# Patient Record
Sex: MALE | URBAN - METROPOLITAN AREA
[De-identification: names, ages, dates, MRNs, and addresses within clinical notes are randomized per-mention and may not be internally consistent; named-entity substitution may affect disease eponyms.]

---

## 2017-08-25 ENCOUNTER — OFFICE VISIT (OUTPATIENT)
Dept: URGENT CARE | Facility: URGENT CARE | Age: 22
End: 2017-08-25

## 2017-08-25 ENCOUNTER — RADIANT APPOINTMENT (OUTPATIENT)
Dept: GENERAL RADIOLOGY | Facility: CLINIC | Age: 22
End: 2017-08-25
Attending: FAMILY MEDICINE

## 2017-08-25 VITALS — TEMPERATURE: 98.1 F | HEART RATE: 55 BPM | OXYGEN SATURATION: 100 % | WEIGHT: 174.3 LBS

## 2017-08-25 DIAGNOSIS — S67.10XA CRUSH INJURY TO FINGER, INITIAL ENCOUNTER: Primary | ICD-10-CM

## 2017-08-25 DIAGNOSIS — S61.211A LACERATION OF LEFT INDEX FINGER WITHOUT FOREIGN BODY WITHOUT DAMAGE TO NAIL, INITIAL ENCOUNTER: ICD-10-CM

## 2017-08-25 PROCEDURE — 12002 RPR S/N/AX/GEN/TRNK2.6-7.5CM: CPT | Performed by: FAMILY MEDICINE

## 2017-08-25 PROCEDURE — 99203 OFFICE O/P NEW LOW 30 MIN: CPT | Mod: 25 | Performed by: FAMILY MEDICINE

## 2017-08-25 PROCEDURE — 73140 X-RAY EXAM OF FINGER(S): CPT | Mod: LT

## 2017-08-25 RX ORDER — IBUPROFEN 800 MG/1
800 TABLET, FILM COATED ORAL EVERY 8 HOURS PRN
Qty: 30 TABLET | Refills: 0 | Status: SHIPPED | OUTPATIENT
Start: 2017-08-25 | End: 2017-09-04

## 2017-08-25 NOTE — PATIENT INSTRUCTIONS
lacer  Laceración, Extremidad [Laceration, Extremity, Sutures, Staples, Or Tape]  Gallito laceración es gallito cortada en la piel. Si es profunda, suele requerir puntos (suturas) o grapas para cerrarla y que pueda cicatrizar. Las cortadas menores pueden tratarse con cinta quirúrgica. Pueden realizarle radiografías si es posible que algo haya entrado en la herida a través del isaias. También es posible que necesite que le coloquen la vacuna antitetánica. Se le colocará si no la tiene actualizada.     Cuidados En [place]       Siga las instrucciones del proveedor de atención médica sobre cómo cuidar de la herida.    Para ayudar a evitar gallito infección, lávese las robb con agua y jabón antes y después de curar la herida.    Mantenga la herida limpia y seca. Si le colocaron un vendaje y éste se moja o se ensucia, cámbielo. En los demás casos, déjelo puesto deepthi las primeras 24 horas y luego cámbielo gallito vez por día o según le indiquen.     Si usaron puntos o grapas limpie la herida todos los días:    Después de quitar el vendaje, lave la terrence afectada con agua y jabón. Use un palillo de algodón (Q tip) para ablandar y limpiar la naveen o las costras que se hayan formado.    Después de red limpiado, mantenga la herida limpia y seca. Hable con isaacs médico antes de aplicarse ninguna pomada antibiótica en la herida. Vuelva a poner el vendaje.     Puede quitar el vendaje para ducharse tamie de costumbre gallito vez transcurridas 24 horas, kesha no moje la terrence afectada en agua (no se sumerja en bañeras o piscinas) hasta que le hayan quitado los puntos o las grapas.    Si usó cinta quirúrgica, mantenga la terrence limpia y seca. Si se moja, séquela con gallito toalla sin frotarla.     El médico puede recetarle gallito crema o pomada antibiótica para evitar gallito infección. No deje de herman estos medicamentos hasta completar el tratamiento o hasta que el médico le diga que deje de hacerlo. El médico puede recetarle medicamentos para el dolor. Tómelos  de acuerdo con las indicaciones del médico.    Evite las actividades que puedan reabrir la herida.      Seguimiento  Programe gallito visita de control con isaacs proveedor de atención médica. La mayoría de las lesiones en la piel sanan en wilner donny. De todas formas, a veces es posible que ocurra gallito infección aún con el tratamiento adecuado. Por lo tanto, revise la herida todos los días para grayson si muestra alguna de las señales de infección descritas a continuación. Los puntos o las grapas deben quitarse en un plazo de 7-14 días. Si usaron cinta quirúrgica, puede quitársela al cabo de 10 días si no se ha caído todavía.     Busque Atención Médica  Llame de inmediato a isaacs proveedor de atención médica si algo de lo siguiente ocurre:    Sangrado que no puede controlarse aplicando presión directa    Señales de infección, incluidos aumento del dolor en la herida, enrojecimiento, hinchazón o pus o mal olor procedentes de la herida    Fiebre de 100.4 F (38 C) o más pablo, o tamie le haya indicado isaacs proveedor de atención médica    Los puntos o las grapas se desprenden o se caen antes de los 7 días    Los bordes de la herida se abren    La herida cambia de color    Adormecimiento alrededor de la herida    Disminución de la movilidad alrededor de la terrence herida  Date Last Reviewed: 6/14/2015 2000-2017 [employer]. [address] [place] [id number]. Todos los derechos reservados. Esta información no pretende sustituir la atención médica profesional. Sólo isaacs médico puede diagnosticar y tratar un problema de anastasia.      Follow up in 5 days for recheck-- sutures may be removed in 5-7 days.    Prescribed Augmentin 875mg po bid x 10 days to prevent infection and ibuprofen 800mg po tid for pain.    Work restrictions to include keeping finger clean and dry.  No work gloves.  No immersion of hands in water for long periods of time other than showering.

## 2017-08-25 NOTE — LETTER
[facility]  [address]  [place] [address]                  [phone]   August 28, 2017    Jerardo Tena[address]  [facility] [address]      Dear Jerardo,    Here is a summary of your recent test results:    Xray report with no acute fracture    Your test results are enclosed.      Please contact me if you have any questions.           Thank you very much for choosing [facility]    Best regards,    Dr. Sami Aguayo        Results for orders placed or performed in visit on 08/25/17   XR Finger Left G/E 2 Views    Narrative    LEFT FINGER TWO OR MORE VIEWS  8/25/2017 7:29 PM     HISTORY: Crush injury to tip of finger with split lac on medial  aspect. Crushing injury of unspecified finger(s), initial encounter.  Laceration without foreign body of left index finger without damage to  nail, initial encounter.      Impression    IMPRESSION: Three views of the left middle finger are performed. Soft  tissue defect is noted in the distal middle finger. No underlying  fracture or dislocation is evident. No radiopaque foreign body is  appreciated.    KRISTIN ROBISON MD

## 2017-08-25 NOTE — MR AVS SNAPSHOT
After Visit Summary   8/25/2017    Jerardo Hernandez    MRN: [id number]           Patient Information     Date Of Birth          1995        Visit Information        Provider Department      8/25/2017 6:20 PM Tamy Mejía MD [facility]        Today's Diagnoses     Crush injury to finger, initial encounter    -  1    Laceration of left index finger without foreign body without damage to nail, initial encounter          Care Instructions    lacer  Laceración, Extremidad [Laceration, Extremity, Sutures, Staples, Or Tape]  Gallito laceración es gallito cortada en la piel. Si es profunda, suele requerir puntos (suturas) o grapas para cerrarla y que pueda cicatrizar. Las cortadas menores pueden tratarse con cinta quirúrgica. Pueden realizarle radiografías si es posible que algo haya entrado en la herida a través del isaias. También es posible que necesite que le coloquen la vacuna antitetánica. Se le colocará si no la tiene actualizada.     Cuidados En [place]       Siga las instrucciones del proveedor de atención médica sobre cómo cuidar de la herida.    Para ayudar a evitar gallito infección, lávese las robb con agua y jabón antes y después de curar la herida.    Mantenga la herida limpia y seca. Si le colocaron un vendaje y éste se moja o se ensucia, cámbielo. En los demás casos, déjelo puesto deepthi las primeras 24 horas y luego cámbielo gallito vez por día o según le indiquen.     Si usaron puntos o grapas limpie la herida todos los días:    Después de quitar el vendaje, lave la terrence afectada con agua y jabón. Use un palillo de algodón (Q tip) para ablandar y limpiar la naveen o las costras que se hayan formado.    Después de red limpiado, mantenga la herida limpia y seca. Hable con isaacs médico antes de aplicarse ninguna pomada antibiótica en la herida. Vuelva a poner el vendaje.     Puede quitar el vendaje para ducharse tamie de costumbre gallito vez transcurridas 24 horas, kesha no  moje la terrence afectada en agua (no se sumerja en bañeras o piscinas) hasta que le hayan quitado los puntos o las grapas.    Si usó cinta quirúrgica, mantenga la terrence limpia y seca. Si se moja, séquela con gallito toalla sin frotarla.     El médico puede recetarle gallito crema o pomada antibiótica para evitar gallito infección. No deje de herman estos medicamentos hasta completar el tratamiento o hasta que el médico le diga que deje de hacerlo. El médico puede recetarle medicamentos para el dolor. Tómelos de acuerdo con las indicaciones del médico.    Evite las actividades que puedan reabrir la herida.      Seguimiento  Programe gallito visita de control con isaacs proveedor de atención médica. La mayoría de las lesiones en la piel sanan en wilner donny. De todas formas, a veces es posible que ocurra gallito infección aún con el tratamiento adecuado. Por lo tanto, revise la herida todos los días para grayson si muestra alguna de las señales de infección descritas a continuación. Los puntos o las grapas deben quitarse en un plazo de 7-14 días. Si usaron cinta quirúrgica, puede quitársela al cabo de 10 días si no se ha caído todavía.     Busque Atención Médica  Llame de inmediato a isaacs proveedor de atención médica si algo de lo siguiente ocurre:    Sangrado que no puede controlarse aplicando presión directa    Señales de infección, incluidos aumento del dolor en la herida, enrojecimiento, hinchazón o pus o mal olor procedentes de la herida    Fiebre de 100.4 F (38 C) o más pablo, o tamie le haya indicado isaacs proveedor de atención médica    Los puntos o las grapas se desprenden o se caen antes de los 7 días    Los bordes de la herida se abren    La herida cambia de color    Adormecimiento alrededor de la herida    Disminución de la movilidad alrededor de la terrence herida  Date Last Reviewed: 6/14/2015 2000-2017 [employer] [place]. [address] [place] [id number]. Todos los derechos reservados. Esta información no pretende sustituir la  "atención médica profesional. Sólo isaacs médico puede diagnosticar y tratar un problema de anastasia.      Follow up in 5 days for recheck-- sutures may be removed in 5-7 days.    Prescribed Augmentin 875mg po bid x 10 days to prevent infection and ibuprofen 800mg po tid for pain.    Work restrictions to include keeping finger clean and dry.  No work gloves.  No immersion of hands in water for long periods of time other than showering.            Follow-ups after your visit        Follow-up notes from your care team     Return if symptoms worsen or fail to improve.      Who to contact     If you have questions or need follow up information about today's clinic visit or your schedule please contact [facility] directly at [phone].  Normal or non-critical lab and imaging results will be communicated to you by MyChart, letter or phone within 4 business days after the clinic has received the results. If you do not hear from us within 7 days, please contact the clinic through [employer] or phone. If you have a critical or abnormal lab result, we will notify you by phone as soon as possible.  Submit refill requests through [employer] or call your pharmacy and they will forward the refill request to us. Please allow 3 business days for your refill to be completed.          Additional Information About Your Visit        [employer] Information     [employer] lets you send messages to your doctor, view your test results, renew your prescriptions, schedule appointments and more. To sign up, go to [email or web address] . Click on \"Log in\" on the left side of the screen, which will take you to the Welcome page. Then click on \"Sign up Now\" on the right side of the page.     You will be asked to enter the access code listed below, as well as some personal information. Please follow the directions to create your username and password.     Your access code is: [email or web address]  Expires: 11/23/2017  7:56 PM     Your access code will "  in 90 days. If you need help or a new code, please call your [place] clinic or [phone].        Care EveryWhere ID     This is your Care EveryWhere ID. This could be used by other organizations to access your [place] medical records  [id number]        Your Vitals Were     Pulse Temperature Pulse Oximetry             55 98.1  F (36.7  C) (Oral) 100%          Blood Pressure from Last 3 Encounters:   No data found for BP    Weight from Last 3 Encounters:   17 174 lb 4.8 oz (79.1 kg)              We Performed the Following     XR Finger Left G/E 2 Views          Today's Medication Changes          These changes are accurate as of: 17  8:00 PM.  If you have any questions, ask your nurse or doctor.               Start taking these medicines.        Dose/Directions    amoxicillin-clavulanate 875-125 MG per tablet   Commonly known as:  AUGMENTIN   Used for:  Laceration of left index finger without foreign body without damage to nail, initial encounter, Crush injury to finger, initial encounter   Started by:  Tamy Mejía MD        Dose:  1 tablet   Take 1 tablet by mouth 2 times daily for 10 days   Quantity:  20 tablet   Refills:  0       ibuprofen 800 MG tablet   Commonly known as:  ADVIL/MOTRIN   Used for:  Crush injury to finger, initial encounter, Laceration of left index finger without foreign body without damage to nail, initial encounter   Started by:  Tamy Mejía MD        Dose:  800 mg   Take 1 tablet (800 mg) by mouth every 8 hours as needed for moderate pain   Quantity:  30 tablet   Refills:  0            Where to get your medicines      These medications were sent to [facility] - [place], [place] - [address]  [address] [place] [address]     Phone:  [phone]     amoxicillin-clavulanate 875-125 MG per tablet    ibuprofen 800 MG tablet                Primary Care Provider     None Specified       No primary provider on file.        Equal Access to Services     CHARLENE HALEY : Hadii aad ku hadlucypietro Major, wasravanthida temichaceha, qamelita oleghoodgisela taylor. So [facility] [phone].    ATENCIÓN: Si habla español, tiene a isaacs disposición servicios gratuitos de asistencia lingüística. Llame al [phone].    We comply with applicable federal civil rights laws and [place] laws. We do not discriminate on the basis of race, color, national origin, age, disability sex, sexual orientation or gender identity.            Thank you!     Thank you for choosing [facility]  for your care. Our goal is always to provide you with excellent care. Hearing back from our patients is one way we can continue to improve our services. Please take a few minutes to complete the written survey that you may receive in the mail after your visit with us. Thank you!             Your Updated Medication List - Protect others around you: Learn how to safely use, store and throw away your medicines at www.disposemymeds.org.          This list is accurate as of: 8/25/17  8:00 PM.  Always use your most recent med list.                   Brand Name Dispense Instructions for use Diagnosis    amoxicillin-clavulanate 875-125 MG per tablet    AUGMENTIN    20 tablet    Take 1 tablet by mouth 2 times daily for 10 days    Laceration of left index finger without foreign body without damage to nail, initial encounter, Crush injury to finger, initial encounter       ibuprofen 800 MG tablet    ADVIL/MOTRIN    30 tablet    Take 1 tablet (800 mg) by mouth every 8 hours as needed for moderate pain    Crush injury to finger, initial encounter, Laceration of left index finger without foreign body without damage to nail, initial encounter

## 2017-08-25 NOTE — PROGRESS NOTES
History of Present Illness:  Patient is a 22 year old male who presents s/p crush injury of LEFT third fingertip after catching finger while trying to connect a hitch earlier this afternoon.  States last tetanus vaccine was 2 years ago.  Split laceration from crush injury noted as well along medial aspect of LEFT third finger tip with shearing of skin at medial edge of nail.  States pain is tolerable feeling that his finger is more asleep than painful.    ROS:  General: Denies any fever or chills  HEENT: Denies eye pain, ear pain, throat pain or runny nose  NECK: Denies neck pain  LUNGS: Denies cough or SOB  CV: Denies chest pain  Abdomen: Denies abdominal pain, denies change in stool  SKIN: Denies rash.  NEURO: Denies dizziness  MUSCULOSKELETAL: +Crush injury of LEFT third finger with macerated tip with sheared skin medial to nail and a skin flap medial to sheared skin  PSYCH: Denies mood change    Objective:  Pulse 55  Temp 98.1  F (36.7  C) (Oral)  Wt 174 lb 4.8 oz (79.1 kg)  SpO2 100%    General:  Patient is alert.  In NAD.  HEENT: NC/AT, PERRL, EOMI  NECK: supple, no LAD  LEFT third finger tip: Crush injury with macerated tip of finger with sheared skin flap medial to sheared skin-- fat tissue out- difficult to approximate the edges.  Area cleaned with Hibiclens and ICE for comfort and swelling prior to sutures.  SKIN: No rashes  NEURO: CN II-XII grossly intact  MUSCULOSKELETAL: No deficits noted.  Normal strength and ROM of LEFT hand and at DIP and PIP third finger  PSYCH: Normal mood and affect    Xray negative for fracture    PROCEDURE  Verbal consent obtained.  1% lidocaine used for anesthesia- 6 cc injected.  #3 interrupted 5.0 Ethilon sutures placed to best approximate the skin flap tissue measuring 2 cm vertical by 2 cm horizontal and close this area.  Gauze tube wrap applied.      Assessment:  Crush injury LEFT third fingertip  Laceration secondary to crush injury medial aspect of LEFT third  finger    Plan:  Prescribed Augmentin 875mg po bid x 10 days to help prevent infection with sheared skin and mechanism of crush injury.  Prescribed ibuprofen 800mg po tid prn pain and swelling.  Wound care instructions given to keep area clean and dry.  FU 5 days for recheck as area was macerated and it was very difficult to approximate and close flap with swelling and fat pushing out beneath flap.    Work restrictions discussed with patient's employer representative here with him nick.    All questions were answered.  Patient voices understanding of the plan at time of discharge.

## 2017-09-01 ENCOUNTER — OFFICE VISIT (OUTPATIENT)
Dept: PEDIATRICS | Facility: CLINIC | Age: 22
End: 2017-09-01

## 2017-09-01 VITALS
HEART RATE: 66 BPM | TEMPERATURE: 97.7 F | SYSTOLIC BLOOD PRESSURE: 120 MMHG | DIASTOLIC BLOOD PRESSURE: 65 MMHG | WEIGHT: 176.7 LBS

## 2017-09-01 DIAGNOSIS — S67.22XD CRUSHING INJURY OF FINGER OF LEFT HAND, SUBSEQUENT ENCOUNTER: Primary | ICD-10-CM

## 2017-09-01 PROCEDURE — 99213 OFFICE O/P EST LOW 20 MIN: CPT | Performed by: NURSE PRACTITIONER

## 2017-09-01 NOTE — NURSING NOTE
Chief Complaint   Patient presents with     Work Comp       Initial /65  Pulse 66  Temp 97.7  F (36.5  C) (Tympanic)  Wt 176 lb 11.2 oz (80.2 kg) There is no height or weight on file to calculate BMI.  Medication Reconciliation: complete

## 2017-09-01 NOTE — LETTER
To Whom It May Concern:    Jerardo Hernandez is a patient of mine. He was evaluated in clinic today and it is my medical opinion that he be allowed to take breaks when doing fine motor activities related to pain. He should keep finger covered at all times.               Thank you           Karin Ziegler, [facility] [place]

## 2017-09-01 NOTE — MR AVS SNAPSHOT
After Visit Summary   9/1/2017    Jerardo Hernandez    MRN: [id number]           Patient Information     Date Of Birth          1995        Visit Information        Provider Department      9/1/2017 9:15 AM Karin Ziegler APRN CNP; [employer] [facility] [place]        Today's Diagnoses     Crushing injury of finger of left hand, subsequent encounter    -  1       Follow-ups after your visit        Additional Services     ORTHO  REFERRAL       [facility] is referring you to the Orthopedic  Services at [facility].       The  Representative will assist you in the coordination of your Orthopedic and Musculoskeletal Care as prescribed by your physician.    The  Representative will call you within 1 business day to help schedule your appointment, or you may contact the  Representative at:    All areas ~ [phone]     Type of Referral : Non Surgical HAND SURGEON      Timeframe requested: 1 wk    Coverage of these services is subject to the terms and limitations of your health insurance plan.  Please call member services at your health plan with any benefit or coverage questions.      If X-rays, CT or MRI's have been performed, please contact the facility where they were done to arrange for , prior to your scheduled appointment.  Please bring this referral request to your appointment and present it to your specialist.                  Who to contact     If you have questions or need follow up information about today's clinic visit or your schedule please contact [facility] directly at [phone].  Normal or non-critical lab and imaging results will be communicated to you by MyChart, letter or phone within 4 business days after the clinic has received the results. If you do not hear from us within 7 days, please contact the clinic through MyChart or phone. If you have a  "critical or abnormal lab result, we will notify you by phone as soon as possible.  Submit refill requests through [employer] or call your pharmacy and they will forward the refill request to us. Please allow 3 business days for your refill to be completed.          Additional Information About Your Visit        MyChart Information     [employer] lets you send messages to your doctor, view your test results, renew your prescriptions, schedule appointments and more. To sign up, go to [email or web address] . Click on \"Log in\" on the left side of the screen, which will take you to the Welcome page. Then click on \"Sign up Now\" on the right side of the page.     You will be asked to enter the access code listed below, as well as some personal information. Please follow the directions to create your username and password.     Your access code is: [email or web address]  Expires: 2017  7:56 PM     Your access code will  in 90 days. If you need help or a new code, please call your [place] clinic or [phone].        Care EveryWhere ID     This is your Care EveryWhere ID. This could be used by other organizations to access your [place] medical records  [id number]        Your Vitals Were     Pulse Temperature                66 97.7  F (36.5  C) (Tympanic)           Blood Pressure from Last 3 Encounters:   17 120/65    Weight from Last 3 Encounters:   17 176 lb 11.2 oz (80.2 kg)   17 174 lb 4.8 oz (79.1 kg)              We Performed the Following     ORTHO  REFERRAL     SUTURE REMOVAL TRAY        Primary Care Provider    None Specified       No primary provider on file.        Equal Access to Services     [facility]: Hadii asher ku hadasho Sohiwot, waaxda luqadaha, qaybta kaalmada brent, gisela calle. So [facility] [phone].    ATENCIÓN: Si habla español, tiene a isaacs disposición servicios gratuitos de asistencia lingüística. Llame al [phone].    We comply with " applicable federal civil rights laws and [place] laws. We do not discriminate on the basis of race, color, national origin, age, disability sex, sexual orientation or gender identity.            Thank you!     Thank you for choosing [facility] [place]  for your care. Our goal is always to provide you with excellent care. Hearing back from our patients is one way we can continue to improve our services. Please take a few minutes to complete the written survey that you may receive in the mail after your visit with us. Thank you!             Your Updated Medication List - Protect others around you: Learn how to safely use, store and throw away your medicines at www.disposemymeds.org.          This list is accurate as of: 9/1/17  9:38 AM.  Always use your most recent med list.                   Brand Name Dispense Instructions for use Diagnosis    amoxicillin-clavulanate 875-125 MG per tablet    AUGMENTIN    20 tablet    Take 1 tablet by mouth 2 times daily for 10 days    Laceration of left index finger without foreign body without damage to nail, initial encounter, Crush injury to finger, initial encounter       ibuprofen 800 MG tablet    ADVIL/MOTRIN    30 tablet    Take 1 tablet (800 mg) by mouth every 8 hours as needed for moderate pain    Crush injury to finger, initial encounter, Laceration of left index finger without foreign body without damage to nail, initial encounter

## 2017-09-01 NOTE — PROGRESS NOTES
SUBJECTIVE:   Jerardo Hernandez is a 22 year old male who presents to clinic today for the following health issues    ED/UC Followup:    Facility:  [facility]  Date of visit: 8-25-17  Reason for visit: Finger injury/laceration  Current Status: Feels better,possible suture removal today     One wk ago, had crush injury of his L hand middle finger and had sutures. He did have an xray. He notes since last OV< his pain is improving. He denies discharge from area. He has been back to work, works as a  and wondering about restrictions.     His tetanus is up to date    -------------------------------------    Problem list and histories reviewed & adjusted, as indicated.  Additional history: as documented    There is no problem list on file for this patient.    History reviewed. No pertinent surgical history.    Social History   Substance Use Topics     Smoking status: Never Smoker     Smokeless tobacco: Never Used     Alcohol use No     Family History   Problem Relation Age of Onset     DIABETES Paternal Grandfather              Reviewed and updated as needed this visit by clinical staff       Reviewed and updated as needed this visit by Provider         ROS:  Constitutional, HEENT, cardiovascular, pulmonary, gi and gu systems are negative, except as otherwise noted.      OBJECTIVE:   /65  Pulse 66  Temp 97.7  F (36.5  C) (Tympanic)  Wt 176 lb 11.2 oz (80.2 kg)  There is no height or weight on file to calculate BMI.  GENERAL: healthy, alert and no distress  MS: L hand middle finger finds lateral aspect of nail is gone, about 4 sutures noted on pad of finger, edges well approximately. Good color of intact nail, little tenderness with palpation.     ASSESSMENT/PLAN:     1. Crushing injury of finger of left hand, subsequent encounter  Sutures removed today without problems, no discharge noted from healing lacerations. Nail bed is exposed and bright pink in color. Color of nail appears well. Will refer for hand  surgeon to ensure full healing, next wk. S/s infection reviewed. Letter written for work to allow him to adjust his duties to allow for limited fine motor as it affects his finger.   - SUTURE REMOVAL TRAY  - ORTHO  REFERRAL    There are no Patient Instructions on file for this visit.      ELENA Hudson [facility] YELENA